# Patient Record
Sex: MALE | Race: WHITE
[De-identification: names, ages, dates, MRNs, and addresses within clinical notes are randomized per-mention and may not be internally consistent; named-entity substitution may affect disease eponyms.]

---

## 2017-04-10 ENCOUNTER — HOSPITAL ENCOUNTER (OUTPATIENT)
Dept: HOSPITAL 47 - ORWHC2ENDO | Age: 47
Discharge: HOME | End: 2017-04-10
Payer: COMMERCIAL

## 2017-04-10 VITALS — DIASTOLIC BLOOD PRESSURE: 86 MMHG | HEART RATE: 61 BPM | SYSTOLIC BLOOD PRESSURE: 124 MMHG

## 2017-04-10 VITALS — RESPIRATION RATE: 16 BRPM

## 2017-04-10 VITALS — TEMPERATURE: 98.1 F

## 2017-04-10 VITALS — BODY MASS INDEX: 21.5 KG/M2

## 2017-04-10 DIAGNOSIS — K29.50: Primary | ICD-10-CM

## 2017-04-10 DIAGNOSIS — F17.200: ICD-10-CM

## 2017-04-10 DIAGNOSIS — L40.9: ICD-10-CM

## 2017-04-10 DIAGNOSIS — R19.4: ICD-10-CM

## 2017-04-10 DIAGNOSIS — K59.00: ICD-10-CM

## 2017-04-10 DIAGNOSIS — F32.9: ICD-10-CM

## 2017-04-10 DIAGNOSIS — K44.9: ICD-10-CM

## 2017-04-10 DIAGNOSIS — F41.9: ICD-10-CM

## 2017-04-10 DIAGNOSIS — K21.0: ICD-10-CM

## 2017-04-10 DIAGNOSIS — Z79.891: ICD-10-CM

## 2017-04-10 DIAGNOSIS — Z79.899: ICD-10-CM

## 2017-04-10 PROCEDURE — 45378 DIAGNOSTIC COLONOSCOPY: CPT

## 2017-04-10 PROCEDURE — 88342 IMHCHEM/IMCYTCHM 1ST ANTB: CPT

## 2017-04-10 PROCEDURE — 88305 TISSUE EXAM BY PATHOLOGIST: CPT

## 2017-04-10 PROCEDURE — 43239 EGD BIOPSY SINGLE/MULTIPLE: CPT

## 2017-04-10 RX ADMIN — POTASSIUM CHLORIDE SCH MLS: 14.9 INJECTION, SOLUTION INTRAVENOUS at 12:30

## 2017-04-10 RX ADMIN — POTASSIUM CHLORIDE SCH MLS: 14.9 INJECTION, SOLUTION INTRAVENOUS at 10:18

## 2017-04-10 RX ADMIN — POTASSIUM CHLORIDE SCH MLS: 14.9 INJECTION, SOLUTION INTRAVENOUS at 13:05

## 2017-04-10 NOTE — P.PCN
Date of Procedure: 04/10/17


Procedure(s) Performed: 


Procedures: 1. Esophagogastroduodenoscopy and biopsy.  2. Total colonoscopy.





Preoperative diagnosis: Chronic reflux symptoms and change in bowel habits.





Postoperative diagnosis: 1. Very small sliding hiatal hernia with minimal 

gastritis.  2. Colon exam within normal limits.





Preparation: HalfLytely prep.





Sedation: Was provided by anesthesia.





Brief clinical history: The patient is a 46-year-old male with history of 

chronic gastroesophageal reflux disease requiring therapy with PPI twice daily 

for symptom control.  He also has been experiencing bowel changes mostly in the 

form constipation having a bowel movement every several days when it used to be 

daily.  The patient's last upper endoscopy was in 2014 and he had no prior 

colonoscopy.





Procedure: With the patient on his left lateral decubitus position and after 

informed consent and adequate sedation, I passed the Olympus- video 

upper endoscope through the cricopharyngeus down the esophagus.  GE junction 

was around 41 cm from the incisors.  It was irregular but I did not see any 

evidence of Carballo's esophagus.  There was a very small sliding hiatal hernia 

1 cm or so in size or less, but but I did not see any evidence of Carballo's 

esophagus.  There were no strictures.  No evidence of esophagitis.  The 

endoscope was then passed into the stomach which was insufflated with air and 

inspected in detail including the retroflex view in the cardia.  There was some 

minimal mottling and erythema in the antrum but no ulcers or erosions.  Pyloric 

channel, duodenal bulb, post bulbar area and descending duodenum did not show 

any obvious abnormalities other than mild erythema.  I obtained biopsies from 

the duodenum, antrum and esophagus then the endoscope was withdrawn and I 

proceeded with the colonoscopy.





Perianal area did not show any fissures or fistulas.  There were no masses felt 

on digital rectal examination.  The Olympus CFQ 160L video colonoscope was then 

inserted in the rectum in the usual fashion and advanced to the cecum.  The 

mucosa appeared healthy.  No polyps or tumors were seen or any obvious 

diverticular disease or other pathology.  I retroflexed endoscope in the rectum 

before the endoscope was withdrawn.





The patient tolerated the procedure well.





Plan: The patient was reassured.  Will continue antireflux diet and measures 

was evaluated biopsy results.  Discussed dietary measures for his constipation 

and I anticipate repeating his colonoscopy in 10 years..

## 2018-09-28 ENCOUNTER — HOSPITAL ENCOUNTER (OUTPATIENT)
Dept: HOSPITAL 47 - RADXRMAIN | Age: 48
End: 2018-09-28
Attending: UROLOGY
Payer: COMMERCIAL

## 2018-09-28 DIAGNOSIS — R31.21: Primary | ICD-10-CM

## 2018-09-28 PROCEDURE — 74018 RADEX ABDOMEN 1 VIEW: CPT

## 2018-09-28 NOTE — XR
Abdomen

 

HISTORY: Microscopic hematuria, asymptomatic

 

Frontal view the abdomen on 2 images.

 

No comparisons

 

Calcifications in the pelvis are indeterminate. There is no pneumoperitoneum or bowel obstruction ifeanyi
dent. Lung bases are clear. Bone mineralization is maintained.

 

IMPRESSION: Nonspecific abdomen.

## 2019-07-20 ENCOUNTER — HOSPITAL ENCOUNTER (OUTPATIENT)
Dept: HOSPITAL 47 - RADXRMAIN | Age: 49
Discharge: HOME | End: 2019-07-20
Attending: UROLOGY
Payer: COMMERCIAL

## 2019-07-20 DIAGNOSIS — N20.0: Primary | ICD-10-CM

## 2019-07-20 DIAGNOSIS — N13.8: ICD-10-CM

## 2019-07-20 DIAGNOSIS — N40.1: ICD-10-CM

## 2019-07-20 PROCEDURE — 74018 RADEX ABDOMEN 1 VIEW: CPT

## 2019-07-20 NOTE — XR
EXAMINATION TYPE: XR KUB

 

DATE OF EXAM: 7/20/2019

 

COMPARISON: 9/20/2018

 

HISTORY: Follow-up kidney stones

 

TECHNIQUE: 2 views

 

FINDINGS: There is no sign of intestinal obstruction or pneumoperitoneum. Fecal pattern is normal. Noa
ng bases are clear. There are no pathologic calcifications over the kidneys.

 

IMPRESSION: Nonacute abdomen. No change.

## 2025-07-08 ENCOUNTER — HOSPITAL ENCOUNTER (OUTPATIENT)
Dept: HOSPITAL 47 - ORWHC2ENDO | Age: 55
Discharge: HOME | End: 2025-07-08
Attending: INTERNAL MEDICINE
Payer: COMMERCIAL

## 2025-07-08 VITALS — DIASTOLIC BLOOD PRESSURE: 96 MMHG | RESPIRATION RATE: 16 BRPM | SYSTOLIC BLOOD PRESSURE: 143 MMHG | HEART RATE: 73 BPM

## 2025-07-08 VITALS — TEMPERATURE: 97.3 F

## 2025-07-08 DIAGNOSIS — K21.9: ICD-10-CM

## 2025-07-08 DIAGNOSIS — K22.70: ICD-10-CM

## 2025-07-08 DIAGNOSIS — K29.70: ICD-10-CM

## 2025-07-08 DIAGNOSIS — D12.3: ICD-10-CM

## 2025-07-08 DIAGNOSIS — D12.2: ICD-10-CM

## 2025-07-08 DIAGNOSIS — Z79.899: ICD-10-CM

## 2025-07-08 DIAGNOSIS — F17.200: ICD-10-CM

## 2025-07-08 DIAGNOSIS — F41.9: ICD-10-CM

## 2025-07-08 DIAGNOSIS — Z12.11: Primary | ICD-10-CM

## 2025-07-08 PROCEDURE — 45385 COLONOSCOPY W/LESION REMOVAL: CPT

## 2025-07-08 PROCEDURE — 88305 TISSUE EXAM BY PATHOLOGIST: CPT

## 2025-07-08 PROCEDURE — 43239 EGD BIOPSY SINGLE/MULTIPLE: CPT

## 2025-07-08 RX ADMIN — POTASSIUM CHLORIDE ONE MLS: 14.9 INJECTION, SOLUTION INTRAVENOUS at 06:59

## 2025-07-08 RX ADMIN — POTASSIUM CHLORIDE SCH MLS/HR: 14.9 INJECTION, SOLUTION INTRAVENOUS at 06:56
